# Patient Record
Sex: FEMALE
[De-identification: names, ages, dates, MRNs, and addresses within clinical notes are randomized per-mention and may not be internally consistent; named-entity substitution may affect disease eponyms.]

---

## 2020-03-16 ENCOUNTER — HOSPITAL ENCOUNTER (OUTPATIENT)
Dept: HOSPITAL 25 - OREAST | Age: 80
Discharge: HOME | End: 2020-03-16
Attending: OPHTHALMOLOGY
Payer: MEDICARE

## 2020-03-16 VITALS — SYSTOLIC BLOOD PRESSURE: 176 MMHG | DIASTOLIC BLOOD PRESSURE: 73 MMHG

## 2020-03-16 DIAGNOSIS — H25.11: Primary | ICD-10-CM

## 2020-03-16 DIAGNOSIS — E11.9: ICD-10-CM

## 2020-03-16 DIAGNOSIS — I10: ICD-10-CM

## 2020-03-16 DIAGNOSIS — E78.00: ICD-10-CM

## 2020-03-16 DIAGNOSIS — E78.5: ICD-10-CM

## 2020-03-16 DIAGNOSIS — F41.8: ICD-10-CM

## 2020-03-16 DIAGNOSIS — Z79.84: ICD-10-CM

## 2020-03-16 PROCEDURE — V2632 POST CHMBR INTRAOCULAR LENS: HCPCS

## 2020-03-16 NOTE — OP
DATE OF OPERATION:  03/16/20 - [facility]

 

DATE OF BIRTH:  [birth year]

 

SURGEON:  Carlos Wagoner MD

 

ANESTHESIA:  Monitored anesthesia care.

 

PRE-OP DIAGNOSIS:  Cataract, right eye.

 

POST-OP DIAGNOSIS:  Cataract, right eye.

 

OPERATIVE PROCEDURE:  Extracapsular cataract extraction of the right eye with 
intraocular lens implant.

 

IMPLANTS:  SN60WF 18.0 diopter lens to the right eye.

 

COMPLICATIONS:  None.

 

DESCRIPTION OF PROCEDURE:  The patient was given phenylephrine 2.5 % and 
cyclopentolate 1% eye drops to the operative eye in the preoperative area.  The 
patient was taken to the operating room where a time-out was taken to identify 
the correct patient, site, and side of surgery.  The patient's right eye was 
prepped and draped in the usual sterile fashion with 5% Betadine.  A second time
-out was taken to verify the correct patient, side, and site of surgery, as 
well as the correct lens implant. A lid speculum was placed to the right eye.  
A 1mm paracentesis blade was used to make a clear corneal incision.  
Preservative-free 1% lidocaine was injected into the anterior chamber.  
DisCoVisc was then injected into the anterior chamber.  A 2.75 mm keratome 
blade was used to make a triplanar incision.  A cystotome initiated a 
capsulorrhexis, which was completed with Utrata forceps in a continuous and 
curvilinear manner.  Hydrodissection of the lens was performed with BSS on a 
cannula.  The lens could be spun in a capsular bag.  The phacoemulsification 
handpiece was used with a divide-and-conquer technique to remove the nucleus.  
The I/A handpiece then removed the residual cortical lens material.  DisCoVisc 
was injected to inflate the capsular bag.  The planned SN60WF 18.0 diopter lens 
was injected into the capsular bag.  The residual DisCoVisc was removed from 
the eye with the I/A handpiece.  The corneal incisions were hydrated and no 
leaks occurred at physiologic pressure around 20 mmHg per palpation. The lid 
speculum was removed and drapes were removed.  Maxitrol ointment was placed to 
the surface of the operative eye.  An adhesive patch and shield was then placed 
on the operative eye.  The patient was taken to the postoperative area in 
stable condition.

 

 [id number]/[facility] #: [id number]

[facility]